# Patient Record
Sex: FEMALE | Race: OTHER | NOT HISPANIC OR LATINO | URBAN - METROPOLITAN AREA
[De-identification: names, ages, dates, MRNs, and addresses within clinical notes are randomized per-mention and may not be internally consistent; named-entity substitution may affect disease eponyms.]

---

## 2019-01-04 PROBLEM — Z00.00 ENCOUNTER FOR PREVENTIVE HEALTH EXAMINATION: Status: ACTIVE | Noted: 2019-01-04

## 2019-01-12 ENCOUNTER — EMERGENCY (EMERGENCY)
Facility: HOSPITAL | Age: 29
LOS: 0 days | Discharge: HOME | End: 2019-01-12
Attending: EMERGENCY MEDICINE | Admitting: EMERGENCY MEDICINE

## 2019-01-12 VITALS
SYSTOLIC BLOOD PRESSURE: 112 MMHG | OXYGEN SATURATION: 99 % | DIASTOLIC BLOOD PRESSURE: 59 MMHG | RESPIRATION RATE: 18 BRPM | TEMPERATURE: 98 F | HEART RATE: 90 BPM

## 2019-01-12 DIAGNOSIS — O20.8 OTHER HEMORRHAGE IN EARLY PREGNANCY: ICD-10-CM

## 2019-01-12 DIAGNOSIS — R10.9 UNSPECIFIED ABDOMINAL PAIN: ICD-10-CM

## 2019-01-12 DIAGNOSIS — Z3A.09 9 WEEKS GESTATION OF PREGNANCY: ICD-10-CM

## 2019-01-12 LAB
ALBUMIN SERPL ELPH-MCNC: 4.3 G/DL — SIGNIFICANT CHANGE UP (ref 3.5–5.2)
ALP SERPL-CCNC: 41 U/L — SIGNIFICANT CHANGE UP (ref 30–115)
ALT FLD-CCNC: 15 U/L — SIGNIFICANT CHANGE UP (ref 0–41)
ANION GAP SERPL CALC-SCNC: 16 MMOL/L — HIGH (ref 7–14)
APPEARANCE UR: ABNORMAL
APTT BLD: 29.3 SEC — SIGNIFICANT CHANGE UP (ref 27–39.2)
AST SERPL-CCNC: 16 U/L — SIGNIFICANT CHANGE UP (ref 0–41)
BACTERIA # UR AUTO: ABNORMAL /HPF
BASOPHILS # BLD AUTO: 0.01 K/UL — SIGNIFICANT CHANGE UP (ref 0–0.2)
BASOPHILS NFR BLD AUTO: 0.1 % — SIGNIFICANT CHANGE UP (ref 0–1)
BILIRUB SERPL-MCNC: <0.2 MG/DL — SIGNIFICANT CHANGE UP (ref 0.2–1.2)
BILIRUB UR-MCNC: NEGATIVE — SIGNIFICANT CHANGE UP
BLD GP AB SCN SERPL QL: SIGNIFICANT CHANGE UP
BUN SERPL-MCNC: 9 MG/DL — LOW (ref 10–20)
CALCIUM SERPL-MCNC: 9.5 MG/DL — SIGNIFICANT CHANGE UP (ref 8.5–10.1)
CHLORIDE SERPL-SCNC: 99 MMOL/L — SIGNIFICANT CHANGE UP (ref 98–110)
CO2 SERPL-SCNC: 24 MMOL/L — SIGNIFICANT CHANGE UP (ref 17–32)
COLOR SPEC: YELLOW — SIGNIFICANT CHANGE UP
CREAT SERPL-MCNC: 0.6 MG/DL — LOW (ref 0.7–1.5)
DIFF PNL FLD: NEGATIVE — SIGNIFICANT CHANGE UP
EOSINOPHIL # BLD AUTO: 0.36 K/UL — SIGNIFICANT CHANGE UP (ref 0–0.7)
EOSINOPHIL NFR BLD AUTO: 4.4 % — SIGNIFICANT CHANGE UP (ref 0–8)
EPI CELLS # UR: ABNORMAL /HPF
GLUCOSE SERPL-MCNC: 73 MG/DL — SIGNIFICANT CHANGE UP (ref 70–99)
GLUCOSE UR QL: NEGATIVE MG/DL — SIGNIFICANT CHANGE UP
HCG SERPL-ACNC: HIGH MIU/ML
HCT VFR BLD CALC: 34.1 % — LOW (ref 37–47)
HGB BLD-MCNC: 10.9 G/DL — LOW (ref 12–16)
IMM GRANULOCYTES NFR BLD AUTO: 0.4 % — HIGH (ref 0.1–0.3)
INR BLD: 0.94 RATIO — SIGNIFICANT CHANGE UP (ref 0.65–1.3)
KETONES UR-MCNC: NEGATIVE — SIGNIFICANT CHANGE UP
LEUKOCYTE ESTERASE UR-ACNC: ABNORMAL
LYMPHOCYTES # BLD AUTO: 1.48 K/UL — SIGNIFICANT CHANGE UP (ref 1.2–3.4)
LYMPHOCYTES # BLD AUTO: 18.2 % — LOW (ref 20.5–51.1)
MCHC RBC-ENTMCNC: 25.5 PG — LOW (ref 27–31)
MCHC RBC-ENTMCNC: 32 G/DL — SIGNIFICANT CHANGE UP (ref 32–37)
MCV RBC AUTO: 79.9 FL — LOW (ref 81–99)
MONOCYTES # BLD AUTO: 0.45 K/UL — SIGNIFICANT CHANGE UP (ref 0.1–0.6)
MONOCYTES NFR BLD AUTO: 5.5 % — SIGNIFICANT CHANGE UP (ref 1.7–9.3)
NEUTROPHILS # BLD AUTO: 5.82 K/UL — SIGNIFICANT CHANGE UP (ref 1.4–6.5)
NEUTROPHILS NFR BLD AUTO: 71.4 % — SIGNIFICANT CHANGE UP (ref 42.2–75.2)
NITRITE UR-MCNC: NEGATIVE — SIGNIFICANT CHANGE UP
NRBC # BLD: 0 /100 WBCS — SIGNIFICANT CHANGE UP (ref 0–0)
PH UR: 6.5 — SIGNIFICANT CHANGE UP (ref 5–8)
PLATELET # BLD AUTO: 193 K/UL — SIGNIFICANT CHANGE UP (ref 130–400)
POTASSIUM SERPL-MCNC: 3.8 MMOL/L — SIGNIFICANT CHANGE UP (ref 3.5–5)
POTASSIUM SERPL-SCNC: 3.8 MMOL/L — SIGNIFICANT CHANGE UP (ref 3.5–5)
PROT SERPL-MCNC: 6.9 G/DL — SIGNIFICANT CHANGE UP (ref 6–8)
PROT UR-MCNC: NEGATIVE MG/DL — SIGNIFICANT CHANGE UP
PROTHROM AB SERPL-ACNC: 10.8 SEC — SIGNIFICANT CHANGE UP (ref 9.95–12.87)
RBC # BLD: 4.27 M/UL — SIGNIFICANT CHANGE UP (ref 4.2–5.4)
RBC # FLD: 13.8 % — SIGNIFICANT CHANGE UP (ref 11.5–14.5)
SODIUM SERPL-SCNC: 139 MMOL/L — SIGNIFICANT CHANGE UP (ref 135–146)
SP GR SPEC: 1.02 — SIGNIFICANT CHANGE UP (ref 1.01–1.03)
TYPE + AB SCN PNL BLD: SIGNIFICANT CHANGE UP
UROBILINOGEN FLD QL: 0.2 MG/DL — SIGNIFICANT CHANGE UP (ref 0.2–0.2)
WBC # BLD: 8.15 K/UL — SIGNIFICANT CHANGE UP (ref 4.8–10.8)
WBC # FLD AUTO: 8.15 K/UL — SIGNIFICANT CHANGE UP (ref 4.8–10.8)
WBC UR QL: ABNORMAL /HPF

## 2019-01-12 NOTE — ED PROVIDER NOTE - NSFOLLOWUPINSTRUCTIONS_ED_ALL_ED_FT
Subchorionic Hematoma    A subchorionic hematoma is a gathering of blood between the outer wall of the placenta and the inner wall of the womb (uterus). The placenta is the organ that connects the fetus to the wall of the uterus. The placenta performs the feeding, breathing (oxygen to the fetus), and waste removal (excretory work) of the fetus.     Subchorionic hematoma is the most common abnormality found on a result from ultrasonography done during the first trimester or early second trimester of pregnancy. If there has been little or no vaginal bleeding, early small hematomas usually shrink on their own and do not affect your baby or pregnancy. The blood is gradually absorbed over 1–2 weeks. When bleeding starts later in pregnancy or the hematoma is larger or occurs in an older pregnant woman, the outcome may not be as good. Larger hematomas may get bigger, which increases the chances for miscarriage. Subchorionic hematoma also increases the risk of premature detachment of the placenta from the uterus,  (premature) labor, and stillbirth.    HOME CARE INSTRUCTIONS  Stay on bed rest if your health care provider recommends this. Although bed rest will not prevent more bleeding or prevent a miscarriage, your health care provider may recommend bed rest until you are advised otherwise.  Avoid heavy lifting (more than 10 lb [4.5 kg]), exercise, sexual intercourse, or douching as directed by your health care provider.  Keep track of the number of pads you use each day and how soaked (saturated) they are. Write down this information.  Do not use tampons.  Keep all follow-up appointments as directed by your health care provider. Your health care provider may ask you to have follow-up blood tests or ultrasound tests or both.    SEEK IMMEDIATE MEDICAL CARE IF:  You have severe cramps in your stomach, back, abdomen, or pelvis.  You have a fever.  You pass large clots or tissue. Save any tissue for your health care provider to look at.  Your bleeding increases or you become lightheaded, feel weak, or have fainting episodes.    ADDITIONAL NOTES AND INSTRUCTIONS    Please follow up with your Primary MD in 24-48 hr.  Seek immediate medical care for any new/worsening signs or symptoms.    Abdominal Pain in Pregnancy    Abdominal pain is common in pregnancy. Most of the time, it does not cause harm. There are many causes of abdominal pain. Some causes are more serious than others. Some of the causes of abdominal pain in pregnancy are easily diagnosed. Occasionally, the diagnosis takes time to understand. Other times, the cause is not determined. Abdominal pain can be a sign that something is very wrong with the pregnancy, or the pain may have nothing to do with the pregnancy at all. For this reason, always tell your health care provider if you have any abdominal discomfort.     HOME CARE INSTRUCTIONS  Monitor your abdominal pain for any changes. The following actions may help to alleviate any discomfort you are experiencing:    Do not have sexual intercourse or put anything in your vagina until your symptoms go away completely.  Get plenty of rest until your pain improves.   Drink clear fluids if you feel nauseous. Avoid solid food as long as you are uncomfortable or nauseous.  Only take over-the-counter or prescription medicine as directed by your health care provider.  Keep all follow-up appointments with your health care provider.    SEEK IMMEDIATE MEDICAL CARE IF:  You are bleeding, leaking fluid, or passing tissue from the vagina.  You have increasing pain or cramping.  You have persistent vomiting.  You have painful or bloody urination.  You have a fever.  You notice a decrease in your baby's movements.  You have extreme weakness or feel faint.  You have shortness of breath, with or without abdominal pain.  You develop a severe headache with abdominal pain.  You have abnormal vaginal discharge with abdominal pain.  You have persistent diarrhea.  You have abdominal pain that continues even after rest, or gets worse.    MAKE SURE YOU:  Understand these instructions.   Will watch your condition.  Will get help right away if you are not doing well or get worse.    ADDITIONAL NOTES AND INSTRUCTIONS    Please follow up with your Primary MD in 24-48 hr.  Seek immediate medical care for any new/worsening signs or symptoms.

## 2019-01-12 NOTE — ED PROVIDER NOTE - NS ED ROS FT
Constitutional: No fever, chills, unintended weight loss.  Eyes:  No visual changes, eye pain or discharge.  ENMT:  No hearing changes, pain, no sore throat or runny nose, no difficulty swallowing  Cardiac:  No chest pain, SOB or edema. No chest pain with exertion.  Respiratory:  No cough or respiratory distress. No hemoptysis. No history of asthma or RAD.  GI:  see hpi; no nvd  :  see hpi; no dysuria, frequency or burning.  MS:  No myalgia, muscle weakness, joint pain or back pain.  Neuro:  No headache or weakness.  No LOC.  Skin:  No skin rash.   Endocrine: No history of thyroid disease or diabetes.

## 2019-01-12 NOTE — ED PROVIDER NOTE - PROVIDER TOKENS
FREE:[LAST:[ObGyn],PHONE:[(   )    -],FAX:[(   )    -],ADDRESS:[your private ObGyn as scheduled 1/22/19]]

## 2019-01-12 NOTE — ED PROVIDER NOTE - PROGRESS NOTE DETAILS
all results d/w pt and copies given, return precautions discussed, has outpt Jorge f/u arranged for 1/22, advised to call office to k for sooner appt (currently living in [facility] location in , advised staff will make appt for her), also given [facility] info if needed, pt ready to go home

## 2019-01-12 NOTE — ED PROVIDER NOTE - PHYSICAL EXAMINATION
VITAL SIGNS: I have reviewed nursing notes and confirm.  CONSTITUTIONAL: Well-developed; well-nourished; in no acute distress.  SKIN: Skin exam is warm and dry, no acute rash.  HEAD: Normocephalic; atraumatic.  EYES: PERRL, EOM intact; conjunctiva and sclera clear.  ENT: No nasal discharge; airway clear.  NECK: Supple; non tender.  CARD: S1, S2 normal; no murmurs, gallops, or rubs. Regular rate and rhythm.  RESP: No wheezes, rales or rhonchi.  ABD: Normal bowel sounds; soft; non-distended; non-tender; no hepatosplenomegaly. no rgr.  BACK: non-tender, no CVAT  EXT: Normal ROM. No clubbing, cyanosis or edema. DPI.  NEURO: Alert, oriented. Grossly unremarkable. No focal deficits.  PSYCH: Cooperative, appropriate.

## 2019-01-12 NOTE — ED PROVIDER NOTE - NSFOLLOWUPCLINICS_GEN_ALL_ED_FT
[facility]  OB/GYN  [address] [place] [address]  Phone: [phone]  Fax:   Follow Up Time:

## 2019-01-12 NOTE — ED PROVIDER NOTE - CARE PLAN
Principal Discharge DX:	Subchorionic hemorrhage in first trimester  Secondary Diagnosis:	Abdominal pain in pregnancy, first trimester

## 2019-01-12 NOTE — ED PROVIDER NOTE - MEDICAL DECISION MAKING DETAILS
lower abd cramping/lbp in pregnancy, no vag bleeding - labs nl, US showing iup w/fhr, 9 wks, subchorionic hematoma - all results d/w pt and copies given, return precautions incl bleeding precautions discussed, pt has outpt ObGyn f/u scheduled for 1/22, also gave [facility] info as needed - pt ready to go home

## 2019-01-12 NOTE — ED PROVIDER NOTE - OBJECTIVE STATEMENT
28y f A1 @ ega 9 wks per US p/w lower abd cramping x 1d. Accomp by lbp. Est lmp early Nov, had IUP confirmed on US performed @ outside hosp in [place] in late Dec. Currently staying @ [facility] in , rpts dvlpmt of lower abd cramping this morning, accomp by lbp. No other sx. No f/c, cp/sob, nvd, flank pain, urinary sx, vag bleeding or discharge. Has ObGYn appt in  scheduled for , does not know name of doctor, was scheduled for her by staff @ [facility].

## 2019-01-13 LAB
CULTURE RESULTS: SIGNIFICANT CHANGE UP
SPECIMEN SOURCE: SIGNIFICANT CHANGE UP

## 2019-01-22 ENCOUNTER — APPOINTMENT (OUTPATIENT)
Dept: OBGYN | Facility: CLINIC | Age: 29
End: 2019-01-22

## 2019-01-22 ENCOUNTER — OUTPATIENT (OUTPATIENT)
Dept: OUTPATIENT SERVICES | Facility: HOSPITAL | Age: 29
LOS: 1 days | Discharge: HOME | End: 2019-01-22

## 2019-01-22 ENCOUNTER — NON-APPOINTMENT (OUTPATIENT)
Age: 29
End: 2019-01-22

## 2019-01-22 ENCOUNTER — RESULT CHARGE (OUTPATIENT)
Age: 29
End: 2019-01-22

## 2019-01-22 VITALS
DIASTOLIC BLOOD PRESSURE: 70 MMHG | HEIGHT: 65 IN | SYSTOLIC BLOOD PRESSURE: 110 MMHG | WEIGHT: 119 LBS | BODY MASS INDEX: 19.83 KG/M2

## 2019-01-22 DIAGNOSIS — Z34.90 ENCOUNTER FOR SUPERVISION OF NORMAL PREGNANCY, UNSPECIFIED, UNSPECIFIED TRIMESTER: ICD-10-CM

## 2019-01-22 RX ORDER — DOXYLAMINE SUCCINATE 25 MG
25 TABLET ORAL
Qty: 30 | Refills: 3 | Status: ACTIVE | COMMUNITY
Start: 2019-01-22 | End: 1900-01-01

## 2019-01-22 RX ORDER — ASPIRIN 81 MG/1
81 TABLET, CHEWABLE ORAL DAILY
Qty: 60 | Refills: 3 | Status: ACTIVE | COMMUNITY
Start: 2019-01-22 | End: 1900-01-01

## 2019-01-22 RX ORDER — PYRIDOXINE HCL (VITAMIN B6) 25 MG
25 TABLET ORAL
Qty: 90 | Refills: 3 | Status: ACTIVE | COMMUNITY
Start: 2019-01-22 | End: 1900-01-01

## 2019-01-23 DIAGNOSIS — Z34.91 ENCOUNTER FOR SUPERVISION OF NORMAL PREGNANCY, UNSPECIFIED, FIRST TRIMESTER: ICD-10-CM

## 2019-01-23 LAB
BILIRUB UR QL STRIP: NORMAL
CLARITY UR: CLEAR
COLLECTION METHOD: NORMAL
GLUCOSE UR-MCNC: NORMAL
HCG UR QL: 0.2 EU/DL
HGB UR QL STRIP.AUTO: NORMAL
KETONES UR-MCNC: NORMAL
LEUKOCYTE ESTERASE UR QL STRIP: NORMAL
NITRITE UR QL STRIP: NORMAL
PH UR STRIP: 7
PROT UR STRIP-MCNC: NORMAL
SP GR UR STRIP: 1.02

## 2019-01-24 LAB
C TRACH RRNA SPEC QL NAA+PROBE: NOT DETECTED
N GONORRHOEA RRNA SPEC QL NAA+PROBE: NOT DETECTED
SOURCE AMPLIFICATION: NORMAL

## 2019-02-06 ENCOUNTER — EMERGENCY (EMERGENCY)
Facility: HOSPITAL | Age: 29
LOS: 0 days | Discharge: HOME | End: 2019-02-06
Attending: EMERGENCY MEDICINE | Admitting: EMERGENCY MEDICINE

## 2019-02-06 ENCOUNTER — LABORATORY RESULT (OUTPATIENT)
Age: 29
End: 2019-02-06

## 2019-02-06 VITALS
OXYGEN SATURATION: 98 % | TEMPERATURE: 98 F | RESPIRATION RATE: 18 BRPM | SYSTOLIC BLOOD PRESSURE: 119 MMHG | HEART RATE: 77 BPM | DIASTOLIC BLOOD PRESSURE: 58 MMHG

## 2019-02-06 DIAGNOSIS — Z79.899 OTHER LONG TERM (CURRENT) DRUG THERAPY: ICD-10-CM

## 2019-02-06 DIAGNOSIS — Z88.7 ALLERGY STATUS TO SERUM AND VACCINE: ICD-10-CM

## 2019-02-06 DIAGNOSIS — O23.591 INFECTION OF OTHER PART OF GENITAL TRACT IN PREGNANCY, FIRST TRIMESTER: ICD-10-CM

## 2019-02-06 DIAGNOSIS — R10.9 UNSPECIFIED ABDOMINAL PAIN: ICD-10-CM

## 2019-02-06 DIAGNOSIS — Z3A.13 13 WEEKS GESTATION OF PREGNANCY: ICD-10-CM

## 2019-02-06 DIAGNOSIS — B37.3 CANDIDIASIS OF VULVA AND VAGINA: ICD-10-CM

## 2019-02-06 NOTE — ED PROVIDER NOTE - NSFOLLOWUPINSTRUCTIONS_ED_ALL_ED_FT
FOLLOW UP WITH YOUR DOCTOR THIS WEEK FOR REEVALUATION.      RETURN TO ED IMMEDIATELY WITH ANY WORSENING SYMPTOMS, VAGINAL BLEEDING, FEVER, ABDOMINAL PAIN, CHEST PAIN, SHORTNESS OF BREATH, WEAKNESS, DIZZINESS, PERSISTENT OR SEVERE HEADACHE OR ANY OTHER CONCERNS.

## 2019-02-06 NOTE — ED ADULT NURSE NOTE - NSIMPLEMENTINTERV_GEN_ALL_ED
Implemented All Universal Safety Interventions:  [place] to call system. Call bell, personal items and telephone within reach. Instruct patient to call for assistance. Room bathroom lighting operational. Non-slip footwear when patient is off stretcher. Physically safe environment: no spills, clutter or unnecessary equipment. Stretcher in lowest position, wheels locked, appropriate side rails in place.

## 2019-02-06 NOTE — ED PROVIDER NOTE - NS ED ROS FT
Constitutional: see HPI  Eyes:  No visual changes, eye pain or discharge.  ENMT:  No hearing changes, pain, discharge or infections. No neck pain or stiffness.  Cardiac:  No chest pain, SOB or edema. No chest pain with exertion.  Respiratory:  No cough or respiratory distress. No hemoptysis. No history of asthma or RAD.  GI:  No nausea, vomiting, diarrhea or abdominal pain.  :  see HPI  MS:  No myalgia, muscle weakness, joint pain or back pain.  Neuro:  No headache or weakness.  No LOC.  Skin:  No skin rash.   Endocrine: No history of thyroid disease or diabetes.

## 2019-02-06 NOTE — ED PROVIDER NOTE - NSFOLLOWUPCLINICS_GEN_ALL_ED_FT
[facility]  OB/GYN  [address] [place] [address]  Phone: [phone]  Fax:   Follow Up Time: 4-6 Days

## 2019-02-06 NOTE — ED PROVIDER NOTE - OBJECTIVE STATEMENT
27 yo pregnant female presents c/o vaginal d/c x several days. Pt , US done  in ED showing 9wks pregnant with subchorionic. Pt seen by GYN in office  and started in aspirin, unisom, paroxetine. 29 yo pregnant female presents c/o vaginal d/c x several days. Pt , US done  in ED showing 9wks pregnant with subchorionic. Pt seen by GYN in office  and started on aspirin, unisom, pyridoxine. Pt denies any abdominal pain or cramping, vaginal bleeding, fevers, chills, flank pain. Pt states she felt her was some itching and odor and whitish d/c. [facility]

## 2019-02-06 NOTE — ED PROVIDER NOTE - PHYSICAL EXAMINATION
VITAL SIGNS: noted, (exam chaperoned by PCA Trista)    CONSTITUTIONAL: Well-developed; well-nourished; in no acute distress  HEAD: Normocephalic; atraumatic  EYES: PERRL, EOM intact; conjunctiva and sclera clear  ENT: No nasal discharge; airway clear. MMM  NECK: Supple; non tender.   CARD: S1, S2 normal; no murmurs, gallops, or rubs. Regular rate and rhythm  RESP: CTAB/L, no wheezes, rales or rhonchi  ABD: Normal bowel sounds; soft; non-distended; non-tender; no hepatosplenomegaly. No CVA tenderness  : normal external genitalia, whitish vaginal discharge, smooth mucosa, no lesions, no CMT or adnexal tenderness , no bleeding   EXT: Normal ROM. No calf tenderness or edema. Distal pulses intact  NEURO: Alert, oriented. Grossly unremarkable. No focal deficits  SKIN: Skin exam is warm and dry, no acute rash  MS: No midline spinal tenderness

## 2019-02-06 NOTE — ED PROVIDER NOTE - MEDICAL DECISION MAKING DETAILS
Pt reassessed, reports feeling well. Spoke with GYN, recommends Terazol 7 day and pt has appt to follow up in clinic 2/19. Pt with out any fevers, abd pain, VB, tolerating PO. Advised close follow up with GYN as scheduled for reevaluation and pt agrees.  Return precautions advised and pt verbalized understanding. Pt reassessed, reports feeling well. Spoke with GYN, recommends Terazol 7 day and pt has appt to follow up in clinic 2/19. Pt with out any fevers, abd pain, VB, tolerating PO. Advised close follow up with GYN as scheduled for reevaluation and pt agreed.  Return precautions advised and pt verbalized understanding. Pt left prior to receiving d/c papers as I was on phone with GYN to obtain her clinic history of meds and pharmacy which she could not provide address for as she states she is from [place]. Pt told that I was sending meds to her [facility].

## 2019-02-07 LAB
ABO + RH PNL BLD: NORMAL
BASOPHILS # BLD AUTO: 0.03 K/UL
BASOPHILS NFR BLD AUTO: 0.3 %
BLD GP AB SCN SERPL QL: NORMAL
C TRACH RRNA SPEC QL NAA+PROBE: SIGNIFICANT CHANGE UP
EOSINOPHIL # BLD AUTO: 0.16 K/UL
EOSINOPHIL NFR BLD AUTO: 1.8 %
HBV SURFACE AG SER QL: NONREACTIVE
HCT VFR BLD CALC: 35.5 %
HGB BLD-MCNC: 11.4 G/DL
HIV1+2 AB SPEC QL IA.RAPID: NONREACTIVE
IMM GRANULOCYTES NFR BLD AUTO: 0.4 %
LYMPHOCYTES # BLD AUTO: 1.62 K/UL
LYMPHOCYTES NFR BLD AUTO: 18 %
MAN DIFF?: NORMAL
MCHC RBC-ENTMCNC: 26.7 PG
MCHC RBC-ENTMCNC: 32.1 G/DL
MCV RBC AUTO: 83.1 FL
MONOCYTES # BLD AUTO: 0.36 K/UL
MONOCYTES NFR BLD AUTO: 4 %
N GONORRHOEA RRNA SPEC QL NAA+PROBE: SIGNIFICANT CHANGE UP
NEUTROPHILS # BLD AUTO: 6.77 K/UL
NEUTROPHILS NFR BLD AUTO: 75.5 %
PLATELET # BLD AUTO: 201 K/UL
RBC # BLD: 4.27 M/UL
RBC # FLD: 14.1 %
RUBV IGG FLD-ACNC: 2.5 INDEX
RUBV IGG SER-IMP: POSITIVE
SPECIMEN SOURCE: SIGNIFICANT CHANGE UP
T PALLIDUM AB SER QL IA: NEGATIVE
VZV AB TITR SER: POSITIVE
VZV IGG SER IF-ACNC: 572.8 INDEX
WBC # FLD AUTO: 8.98 K/UL

## 2019-02-11 ENCOUNTER — EMERGENCY (EMERGENCY)
Facility: HOSPITAL | Age: 29
LOS: 0 days | Discharge: HOME | End: 2019-02-11
Attending: EMERGENCY MEDICINE | Admitting: EMERGENCY MEDICINE

## 2019-02-11 VITALS
SYSTOLIC BLOOD PRESSURE: 112 MMHG | RESPIRATION RATE: 18 BRPM | DIASTOLIC BLOOD PRESSURE: 76 MMHG | OXYGEN SATURATION: 100 % | HEART RATE: 82 BPM

## 2019-02-11 VITALS
HEART RATE: 85 BPM | DIASTOLIC BLOOD PRESSURE: 77 MMHG | RESPIRATION RATE: 18 BRPM | OXYGEN SATURATION: 100 % | SYSTOLIC BLOOD PRESSURE: 113 MMHG | TEMPERATURE: 98 F

## 2019-02-11 DIAGNOSIS — Z88.8 ALLERGY STATUS TO OTHER DRUGS, MEDICAMENTS AND BIOLOGICAL SUBSTANCES: ICD-10-CM

## 2019-02-11 DIAGNOSIS — J45.909 UNSPECIFIED ASTHMA, UNCOMPLICATED: ICD-10-CM

## 2019-02-11 DIAGNOSIS — R10.30 LOWER ABDOMINAL PAIN, UNSPECIFIED: ICD-10-CM

## 2019-02-11 DIAGNOSIS — R10.9 UNSPECIFIED ABDOMINAL PAIN: ICD-10-CM

## 2019-02-11 DIAGNOSIS — O21.9 VOMITING OF PREGNANCY, UNSPECIFIED: ICD-10-CM

## 2019-02-11 DIAGNOSIS — Z3A.14 14 WEEKS GESTATION OF PREGNANCY: ICD-10-CM

## 2019-02-11 DIAGNOSIS — Z79.899 OTHER LONG TERM (CURRENT) DRUG THERAPY: ICD-10-CM

## 2019-02-11 LAB
ALBUMIN SERPL ELPH-MCNC: 4.1 G/DL — SIGNIFICANT CHANGE UP (ref 3.5–5.2)
ALP SERPL-CCNC: 53 U/L — SIGNIFICANT CHANGE UP (ref 30–115)
ALT FLD-CCNC: 14 U/L — SIGNIFICANT CHANGE UP (ref 0–41)
ANION GAP SERPL CALC-SCNC: 16 MMOL/L — HIGH (ref 7–14)
APPEARANCE UR: ABNORMAL
AST SERPL-CCNC: 21 U/L — SIGNIFICANT CHANGE UP (ref 0–41)
BACTERIA UR CULT: NORMAL
BASOPHILS # BLD AUTO: 0.01 K/UL — SIGNIFICANT CHANGE UP (ref 0–0.2)
BASOPHILS NFR BLD AUTO: 0.2 % — SIGNIFICANT CHANGE UP (ref 0–1)
BILIRUB SERPL-MCNC: 0.2 MG/DL — SIGNIFICANT CHANGE UP (ref 0.2–1.2)
BILIRUB UR-MCNC: NEGATIVE — SIGNIFICANT CHANGE UP
BUN SERPL-MCNC: 12 MG/DL — SIGNIFICANT CHANGE UP (ref 10–20)
CALCIUM SERPL-MCNC: 8.8 MG/DL — SIGNIFICANT CHANGE UP (ref 8.5–10.1)
CHLORIDE SERPL-SCNC: 96 MMOL/L — LOW (ref 98–110)
CO2 SERPL-SCNC: 24 MMOL/L — SIGNIFICANT CHANGE UP (ref 17–32)
COLOR SPEC: YELLOW — SIGNIFICANT CHANGE UP
CREAT SERPL-MCNC: 0.6 MG/DL — LOW (ref 0.7–1.5)
DIFF PNL FLD: NEGATIVE — SIGNIFICANT CHANGE UP
EOSINOPHIL # BLD AUTO: 0.09 K/UL — SIGNIFICANT CHANGE UP (ref 0–0.7)
EOSINOPHIL NFR BLD AUTO: 1.9 % — SIGNIFICANT CHANGE UP (ref 0–8)
EPI CELLS # UR: ABNORMAL /HPF
GLUCOSE SERPL-MCNC: 84 MG/DL — SIGNIFICANT CHANGE UP (ref 70–99)
GLUCOSE UR QL: NEGATIVE — SIGNIFICANT CHANGE UP
HCT VFR BLD CALC: 32.9 % — LOW (ref 37–47)
HGB A MFR BLD: 97.2 %
HGB A2 MFR BLD: 2.8 %
HGB BLD-MCNC: 11 G/DL — LOW (ref 12–16)
HGB FRACT BLD-IMP: NORMAL
IMM GRANULOCYTES NFR BLD AUTO: 0.6 % — HIGH (ref 0.1–0.3)
KETONES UR-MCNC: NEGATIVE — SIGNIFICANT CHANGE UP
LEAD BLD-MCNC: <1 UG/DL
LEUKOCYTE ESTERASE UR-ACNC: ABNORMAL
LYMPHOCYTES # BLD AUTO: 0.74 K/UL — LOW (ref 1.2–3.4)
LYMPHOCYTES # BLD AUTO: 15.4 % — LOW (ref 20.5–51.1)
M TB IFN-G BLD-IMP: NEGATIVE
MCHC RBC-ENTMCNC: 26.6 PG — LOW (ref 27–31)
MCHC RBC-ENTMCNC: 33.4 G/DL — SIGNIFICANT CHANGE UP (ref 32–37)
MCV RBC AUTO: 79.7 FL — LOW (ref 81–99)
MONOCYTES # BLD AUTO: 0.38 K/UL — SIGNIFICANT CHANGE UP (ref 0.1–0.6)
MONOCYTES NFR BLD AUTO: 7.9 % — SIGNIFICANT CHANGE UP (ref 1.7–9.3)
NEUTROPHILS # BLD AUTO: 3.55 K/UL — SIGNIFICANT CHANGE UP (ref 1.4–6.5)
NEUTROPHILS NFR BLD AUTO: 74 % — SIGNIFICANT CHANGE UP (ref 42.2–75.2)
NITRITE UR-MCNC: NEGATIVE — SIGNIFICANT CHANGE UP
NRBC # BLD: 0 /100 WBCS — SIGNIFICANT CHANGE UP (ref 0–0)
PH UR: 6.5 — SIGNIFICANT CHANGE UP (ref 5–8)
PLATELET # BLD AUTO: 163 K/UL — SIGNIFICANT CHANGE UP (ref 130–400)
POTASSIUM SERPL-MCNC: 4.2 MMOL/L — SIGNIFICANT CHANGE UP (ref 3.5–5)
POTASSIUM SERPL-SCNC: 4.2 MMOL/L — SIGNIFICANT CHANGE UP (ref 3.5–5)
PROT SERPL-MCNC: 6.8 G/DL — SIGNIFICANT CHANGE UP (ref 6–8)
PROT UR-MCNC: ABNORMAL
QUANTIFERON TB PLUS MITOGEN MINUS NIL: 5.49 IU/ML
QUANTIFERON TB PLUS NIL: 0.01 IU/ML
QUANTIFERON TB PLUS TB1 MINUS NIL: 0 IU/ML
QUANTIFERON TB PLUS TB2 MINUS NIL: 0 IU/ML
RBC # BLD: 4.13 M/UL — LOW (ref 4.2–5.4)
RBC # FLD: 13.5 % — SIGNIFICANT CHANGE UP (ref 11.5–14.5)
SODIUM SERPL-SCNC: 136 MMOL/L — SIGNIFICANT CHANGE UP (ref 135–146)
SP GR SPEC: >=1.03 — SIGNIFICANT CHANGE UP (ref 1.01–1.03)
UROBILINOGEN FLD QL: 1 (ref 0.2–0.2)
WBC # BLD: 4.8 K/UL — SIGNIFICANT CHANGE UP (ref 4.8–10.8)
WBC # FLD AUTO: 4.8 K/UL — SIGNIFICANT CHANGE UP (ref 4.8–10.8)
WBC UR QL: SIGNIFICANT CHANGE UP /HPF

## 2019-02-11 RX ORDER — ONDANSETRON 8 MG/1
4 TABLET, FILM COATED ORAL ONCE
Qty: 0 | Refills: 0 | Status: COMPLETED | OUTPATIENT
Start: 2019-02-11 | End: 2019-02-11

## 2019-02-11 RX ORDER — SODIUM CHLORIDE 9 MG/ML
1000 INJECTION, SOLUTION INTRAVENOUS
Qty: 0 | Refills: 0 | Status: DISCONTINUED | OUTPATIENT
Start: 2019-02-11 | End: 2019-02-11

## 2019-02-11 RX ORDER — FAMOTIDINE 10 MG/ML
20 INJECTION INTRAVENOUS ONCE
Qty: 0 | Refills: 0 | Status: COMPLETED | OUTPATIENT
Start: 2019-02-11 | End: 2019-02-11

## 2019-02-11 RX ADMIN — FAMOTIDINE 20 MILLIGRAM(S): 10 INJECTION INTRAVENOUS at 14:00

## 2019-02-11 RX ADMIN — ONDANSETRON 4 MILLIGRAM(S): 8 TABLET, FILM COATED ORAL at 13:59

## 2019-02-11 RX ADMIN — SODIUM CHLORIDE 125 MILLILITER(S): 9 INJECTION, SOLUTION INTRAVENOUS at 14:00

## 2019-02-11 NOTE — ED PROVIDER NOTE - NSFOLLOWUPCLINICS_GEN_ALL_ED_FT
[facility]  OB/GYN  [address] [place] [address]  Phone: [phone]  Fax:   Follow Up Time:

## 2019-02-11 NOTE — ED PROVIDER NOTE - OBJECTIVE STATEMENT
27yo , at 14w0d dated by 1st trimester sonogram, female presents with nausea and vomiting that occurred on 19, that started about 8 hours after eating uncooked chicken from the store. She reports clear fuid vomiting, denies coffee ground or bloody emesis. Reports vomiting subsided about 12 hours later. Reports suprapubic pain that started yesterday, intermittent, and ranges between 0-7/10.  Last bowel movement was yesterday, normal consistency. Denies HA, vision changes, CP, SOB, RUQ pain/ Epigastric pain, LE pain/ swelling, diarrhea, pain/difficulty with urination, fevers, chills. Denies palpitations or lightheadedness.   Ob: DANELLE 2019. Denies cramping, LOF, VB or vaginal discharge. 29yo , at 14w0d dated by 1st trimester sonogram, female presents with nausea and vomiting that occurred on 19, that started about 8 hours after eating uncooked chicken from the store. She reports clear fluid vomiting, denies coffee ground or bloody emesis. Reports vomiting subsided about 12 hours later. Reports suprapubic pain that started yesterday, intermittent, and ranges between 0-7/10.  Last bowel movement was yesterday, normal consistency. Denies HA, vision changes, CP, SOB, RUQ pain/ Epigastric pain, LE pain/ swelling, diarrhea, pain/difficulty with urination, fevers, chills. Denies palpitations or lightheadedness.   Ob: DANELLE 2019. Denies cramping, LOF, VB or vaginal discharge.

## 2019-02-11 NOTE — ED PROVIDER NOTE - NS ED ROS FT
Denies HA, vision changes, CP, SOB, RUQ pain/ Epigastric pain, LE pain/ swelling, diarrhea, pain/difficulty with urination, fevers, chills. Denies palpitations or lightheadedness.

## 2019-02-11 NOTE — ED PROVIDER NOTE - NSFOLLOWUPINSTRUCTIONS_ED_ALL_ED_FT
Vomiting occurs when stomach contents are thrown up and out of the mouth. Many people notice nausea before vomiting. Vomiting can make you feel weak and dehydrated. Dehydration can make you tired and thirsty, cause you to have a dry mouth, and decrease how often you urinate. Older adults and people who have other diseases or a weak immune system are at higher risk for dehydration. It is important to treat vomiting as told by your health care provider.    Follow these instructions at home:  Follow your health care provider’s instructions about how to care for yourself at home.    Eating and drinking     Follow these recommendations as told by your health care provider:    Take an oral rehydration solution (ORS). This is a drink that is sold at pharmacies and retail stores.  Eat bland, easy-to-digest foods in small amounts as you are able. These foods include bananas, applesauce, rice, lean meats, toast, and crackers.  Drink clear fluids in small amounts as you are able. Clear fluids include water, ice chips, low-calorie sports drinks, and fruit juice that has water added (diluted fruit juice).  Avoid fluids that contain a lot of sugar or caffeine.  Avoid alcohol and foods that are spicy or fatty.    General instructions     Image   Wash your hands frequently with soap and water. If soap and water are not available, use hand . Make sure that everyone in your household washes their hands frequently.  Take over-the-counter and prescription medicines only as told by your health care provider.  Watch your condition for any changes.  Keep all follow-up visits as told by your health care provider. This is important.  Contact a health care provider if:  You have a fever.  You are not able to keep fluids down.  Your vomiting gets worse.  You have new symptoms.  You feel light-headed or dizzy.  You have a headache.  You have muscle cramps.  Get help right away if:  You have pain in your chest, neck, arm, or jaw.  You feel extremely weak or you faint.  You have persistent vomiting.  You have vomit that is bright red or looks like black coffee grounds.  You have stools that are bloody or black, or stools that look like tar.  You have severe pain, cramping, or bloating in your abdomen.  You have a severe headache, a stiff neck, or both.  You have a rash.  You have trouble breathing or you are breathing very quickly.  Your heart is beating very quickly.  Your skin feels cold and clammy.  You feel confused.  You have pain while urinating.  You have signs of dehydration, such as:    Dark urine, or very little or no urine.  Cracked lips.  Dry mouth.  Sunken eyes.  Sleepiness.

## 2019-02-11 NOTE — ED PROVIDER NOTE - ATTENDING CONTRIBUTION TO CARE
28 female h/o asthma, currently 14 weeks pregnant her for evaluation to " make sure I do not have a virus".  Patient states she developed nausea and vomiting 2 night ago, yesterday  morning was better and has been tolerating PO.  Denies any abdominal pain at present, no vomiting or diarrhea, no vaginal bleeding or discharge, no urinary complaints.  Well-appearing, well-hydrated, NAD, PERRL, mmm, nml work of breathing, lungs CTA b/l, equal air entry b/l RRR, capillary refill < 2 sec, distal pulses intact, abdomen soft, NT/ND, BS present in all quadrants, no CVA or midline spine ttp, awake and alert,.   labs, fluids, Fetal heart rate, d/c home.

## 2019-02-11 NOTE — ED PROCEDURE NOTE - ATTENDING CONTRIBUTION TO CARE
. I was present for and supervised the key critical aspects of the procedures performed during the care of the patient. This ends my involvement in the patients care

## 2019-02-11 NOTE — ED ADULT NURSE NOTE - NSIMPLEMENTINTERV_GEN_ALL_ED
Implemented All Universal Safety Interventions:  [place] to call system. Call bell, personal items and telephone within reach. Instruct patient to call for assistance. Room bathroom lighting operational. Non-slip footwear when patient is off stretcher. Physically safe environment: no spills, clutter or unnecessary equipment. Stretcher in lowest position, wheels locked, appropriate side rails in place.

## 2019-02-11 NOTE — ED ADULT NURSE NOTE - CHIEF COMPLAINT QUOTE
c/o vomiting and abd pain since Saturday, states she thinks she had bad chicken for dinner on Saturday. Pt 14 weeks pregnant. Denies any vaginal bleeding

## 2019-02-11 NOTE — ED ADULT NURSE NOTE - OBJECTIVE STATEMENT
pt presents to the ED 14 weeks pregnant for abdominal pain and vomiting since Saturday after eating chicken. pt denies fevers, diarrhea.

## 2019-02-11 NOTE — ED PROVIDER NOTE - MEDICAL DECISION MAKING DETAILS
27 yo female with resolved vomiting.  Abdomen soft and nontender, no complaints related to pregnancy, d/c home in a stable condition. 27 yo female with resolved vomiting.  Abdomen soft and nontender, no complaints related to pregnancy, d/c home in a stable condition.  + FHT

## 2019-02-14 LAB — AR GENE MUT ANL BLD/T: NEGATIVE

## 2019-02-15 LAB — CFTR MUT TESTED BLD/T: POSITIVE

## 2019-02-19 ENCOUNTER — APPOINTMENT (OUTPATIENT)
Dept: OBGYN | Facility: CLINIC | Age: 29
End: 2019-02-19

## 2019-09-09 NOTE — ED ADULT NURSE NOTE - CAS EDN DISCHARGE INTERVENTIONS
[Alert] : alert [Awake] : awake [Soft] : soft [No Lesions] : no genitalia lesions [Oriented x3] : oriented to person, place, and time [Labia Minora] : labia minora [Labia Majora] : labia major [No Bleeding] : there was no active vaginal bleeding [Normal] : clitoris [Normal Position] : in a normal position [Pap Obtained] : a Pap smear was performed [Uterine Adnexae] : were not tender and not enlarged [Acute Distress] : no acute distress [Mass] : no breast mass [Nipple Discharge] : no nipple discharge [Axillary LAD] : no axillary lymphadenopathy [Tender] : non tender [Tenderness] : nontender [Enlarged ___ wks] : not enlarged [Mass ___ cm] : no uterine mass was palpated [Adnexa Tenderness] : were not tender [External Hemorrhoid] : no external hemorrhoids [Ovarian Mass (___ Cm)] : there were no adnexal masses IV discontinued, cath removed intact

## 2019-11-26 NOTE — ED ADULT NURSE NOTE - NSHISCREENINGQ1_ED_A_ED
Patient states her BP is high / 161/93 (yesterday)  Didn't want to take it today because she has anxiety.  No appts available and didn't want to see anyone else also due to anxiety.  Please call to discuss.    No

## 2023-01-14 NOTE — ED PROVIDER NOTE - CHIEF COMPLAINT
Pt states \"I took my RX meds that was in my pocket while here\". Pt took Pritiq/Midodrine/Crestor/Bactrim. Cha NP made aware. Pt able to ambulate around ED Unit felt dizzy with walking/unable to  SPO2/HR. Pt fingers cold. Pt vitals repeated after returning to bed. Vitals Stable.      Beni Ramirez LPN  71/95/27 [id number] The patient is a 28y Female complaining of abdominal pain.

## 2023-10-31 NOTE — ED ADULT NURSE NOTE - CAS DISCH TRANSFER METHOD
Pt received Tubersol .1mL intradermal on left forearm. Lot # [id number] Exp: 2026/10  Pt tolerated well. Will come back in 48-72 hours for test results.  
Private car

## 2024-05-01 NOTE — ED ADULT NURSE NOTE - NS ED NURSE LEVEL OF CONSCIOUSNESS ORIENTATION
5/1 This is scheduled by the surgery schedulers, I will forward this message to .     [facility]   Orthopedics, Podiatry, Sports Medicine, Ent ,Eye , Audiology, Adult Endocrine & Diabetes, Nutrition & Medication Therapy Management Specialties   [facility]       Oriented - self; Oriented - place; Oriented - time

## 2024-06-27 NOTE — ED PROVIDER NOTE - NS_EDPROVIDERDISPOUSERTYPE_ED_A_ED
Called patient as follow up. LMOM with call back number.    Attending Attestation (For Attendings USE Only)...
